# Patient Record
Sex: FEMALE | Race: WHITE | Employment: FULL TIME | URBAN - METROPOLITAN AREA
[De-identification: names, ages, dates, MRNs, and addresses within clinical notes are randomized per-mention and may not be internally consistent; named-entity substitution may affect disease eponyms.]

---

## 2022-09-05 ENCOUNTER — OCCUPATIONAL HEALTH (OUTPATIENT)
Dept: URGENT CARE | Facility: CLINIC | Age: 37
End: 2022-09-05

## 2022-09-05 DIAGNOSIS — Z11.1 VISIT FOR TB SKIN TEST: Primary | ICD-10-CM

## 2022-09-05 PROCEDURE — 86580 POCT TB SKIN TEST: ICD-10-PCS | Mod: S$GLB,,, | Performed by: PHYSICIAN ASSISTANT

## 2022-09-05 PROCEDURE — 86580 TB INTRADERMAL TEST: CPT | Mod: S$GLB,,, | Performed by: PHYSICIAN ASSISTANT

## 2022-09-07 ENCOUNTER — OFFICE VISIT (OUTPATIENT)
Dept: URGENT CARE | Facility: CLINIC | Age: 37
End: 2022-09-07

## 2022-09-07 LAB
TB INDURATION - 48 HR READ: 0 MM
TB INDURATION - 72 HR READ: 0 MM
TB SKIN TEST - 48 HR READ: NEGATIVE
TB SKIN TEST - 72 HR READ: NEGATIVE

## 2022-09-07 NOTE — PROGRESS NOTES
Pt presents for skin test read from 9/5/22. Does not need visit for this. Please disregard.   This encounter was created in error - please disregard.

## 2022-09-08 ENCOUNTER — TELEPHONE (OUTPATIENT)
Dept: URGENT CARE | Facility: CLINIC | Age: 37
End: 2022-09-08

## 2024-11-06 ENCOUNTER — OCCUPATIONAL HEALTH (OUTPATIENT)
Dept: URGENT CARE | Facility: CLINIC | Age: 39
End: 2024-11-06

## 2024-11-06 DIAGNOSIS — Z23 ENCOUNTER FOR IMMUNIZATION: Primary | ICD-10-CM
